# Patient Record
Sex: MALE | Race: WHITE
[De-identification: names, ages, dates, MRNs, and addresses within clinical notes are randomized per-mention and may not be internally consistent; named-entity substitution may affect disease eponyms.]

---

## 2018-10-11 ENCOUNTER — HOSPITAL ENCOUNTER (EMERGENCY)
Dept: HOSPITAL 97 - ER | Age: 38
Discharge: HOME | End: 2018-10-11
Payer: COMMERCIAL

## 2018-10-11 DIAGNOSIS — Z88.5: ICD-10-CM

## 2018-10-11 DIAGNOSIS — I10: Primary | ICD-10-CM

## 2018-10-11 DIAGNOSIS — F41.9: ICD-10-CM

## 2018-10-11 LAB
ALBUMIN SERPL BCP-MCNC: 4.1 G/DL (ref 3.4–5)
ALP SERPL-CCNC: 67 U/L (ref 45–117)
ALT SERPL W P-5'-P-CCNC: 83 U/L (ref 12–78)
AST SERPL W P-5'-P-CCNC: 28 U/L (ref 15–37)
BUN BLD-MCNC: 12 MG/DL (ref 7–18)
GLUCOSE SERPLBLD-MCNC: 182 MG/DL (ref 74–106)
HCT VFR BLD CALC: 44.6 % (ref 39.6–49)
INR BLD: 1.13
LYMPHOCYTES # SPEC AUTO: 1.6 K/UL (ref 0.7–4.9)
MAGNESIUM SERPL-MCNC: 2.1 MG/DL (ref 1.8–2.4)
MCH RBC QN AUTO: 28.4 PG (ref 27–35)
MCV RBC: 80 FL (ref 80–100)
NT-PROBNP SERPL-MCNC: 34 PG/ML (ref ?–125)
PMV BLD: 9.9 FL (ref 7.6–11.3)
POTASSIUM SERPL-SCNC: 3.5 MMOL/L (ref 3.5–5.1)
RBC # BLD: 5.58 M/UL (ref 4.33–5.43)
TROPONIN (EMERG DEPT USE ONLY): < 0.02 NG/ML (ref 0–0.04)

## 2018-10-11 PROCEDURE — 36415 COLL VENOUS BLD VENIPUNCTURE: CPT

## 2018-10-11 PROCEDURE — 81003 URINALYSIS AUTO W/O SCOPE: CPT

## 2018-10-11 PROCEDURE — 83880 ASSAY OF NATRIURETIC PEPTIDE: CPT

## 2018-10-11 PROCEDURE — 80076 HEPATIC FUNCTION PANEL: CPT

## 2018-10-11 PROCEDURE — 85025 COMPLETE CBC W/AUTO DIFF WBC: CPT

## 2018-10-11 PROCEDURE — 80048 BASIC METABOLIC PNL TOTAL CA: CPT

## 2018-10-11 PROCEDURE — 84484 ASSAY OF TROPONIN QUANT: CPT

## 2018-10-11 PROCEDURE — 71045 X-RAY EXAM CHEST 1 VIEW: CPT

## 2018-10-11 PROCEDURE — 93005 ELECTROCARDIOGRAM TRACING: CPT

## 2018-10-11 PROCEDURE — 99284 EMERGENCY DEPT VISIT MOD MDM: CPT

## 2018-10-11 PROCEDURE — 83735 ASSAY OF MAGNESIUM: CPT

## 2018-10-11 PROCEDURE — 85610 PROTHROMBIN TIME: CPT

## 2018-10-11 NOTE — ER
Nurse's Notes                                                                                     

 [facility]                                                                

Name: Barrington Edward                                                                            

Age: 38 yrs                                                                                       

Sex: Male                                                                                         

: 1980                                                                                   

MRN: [id number]                                                                                   

Arrival Date: 10/11/2018                                                                          

Time: 14:24                                                                                       

Account#: [id number]                                                                            

Bed 14                                                                                            

Private MD:                                                                                       

Diagnosis: Palpitations;Anxiety disorder, unspecified;Essential (primary) hypertension            

                                                                                                  

Presentation:                                                                                     

10/11                                                                                             

14:24 Presenting complaint: EMS states: He was at work when he started to have palpitations   aj1 

      and feel anxious. When they checked his blood pressure it was 220/120. Patient reports      

      that he has had high blood pressure before but he lost weight and was able to stop          

      taking medication for it. Patient reports that his blood pressure has never been that       

      high before. He was given NTG x2 doses sublingual en route. Patient reports headache        

      and chest pressure at this time. Denies SOB, dizziness. Transition of care: patient was     

      not received from another setting of care. Onset of symptoms was 2018. Risk     

      Assessment: Do you want to hurt yourself or someone else? Patient reports no desire to      

      harm self or others. Initial Sepsis Screen: Does the patient meet any 2 criteria? No.       

      Patient's initial sepsis screen is negative. Does the patient have a suspected source       

      of infection? No. Patient's initial sepsis screen is negative. Care prior to arrival:       

      None.                                                                                       

14:24 Method Of Arrival: EMS: [facility]                                                         aj 

14:24 Acuity: MARIZA 3                                                                           aj1 

                                                                                                  

Triage Assessment:                                                                                

14:28 General: Appears in no apparent distress. uncomfortable, Behavior is cooperative,       aj1 

      anxious. Pain: Complains of pain in mid-sternal area Pain currently is 2 out of 10 on a     

      pain scale.                                                                                 

                                                                                                  

Historical:                                                                                       

- Allergies:                                                                                      

14:28 hydrocodone;                                                                            aj1 

- Home Meds:                                                                                      

14:28 None [Active];                                                                          aj1 

- PMHx:                                                                                           

14:28 Hypertension;                                                                           aj1 

- PSHx:                                                                                           

14:28 None;                                                                                   aj1 

                                                                                                  

- Immunization history:: Flu vaccine is not up to date.                                           

- Social history:: Smoking status: Patient/guardian denies using tobacco.                         

- Ebola Screening: : Patient denies travel to an Ebola-affected area in the 21 days               

  before illness onset.                                                                           

                                                                                                  

                                                                                                  

Screenin:29 Abuse screen: Denies threats or abuse. Denies injuries from another. Nutritional        aj1 

      screening: No deficits noted. Tuberculosis screening: No symptoms or risk factors           

      identified.                                                                                 

19:00 Fall Risk None identified.                                                              jb4 

                                                                                                  

Assessment:                                                                                       

14:29 General: Appears in no apparent distress. uncomfortable, Behavior is cooperative,       aj1 

      anxious. Pain: Complains of pain in mid-sternal area Pain does not radiate. Pain            

      currently is 2 out of 10 on a pain scale. Quality of pain is described as pressure.         

      Neuro: Level of Consciousness is awake, alert, obeys commands, Speech is normal, Facial     

      symmetry appears normal. Cardiovascular: Reports chest pain, palpitations, Denies           

      lightheadedness, vomiting, Heart tones S1 S2 present Patient's skin is warm and dry.        

      Rhythm is sinus tachycardia Chest pain is described as mild, quality is pressure, is        

      located in substernal area. Respiratory: Airway is patent Respiratory effort is even,       

      unlabored, Respiratory pattern is regular, symmetrical, Breath sounds are clear             

      bilaterally. Denies shortness of breath. GI: No signs and/or symptoms were reported         

      involving the gastrointestinal system. : No signs and/or symptoms were reported           

      regarding the genitourinary system. EENT: No signs and/or symptoms were reported            

      regarding the EENT system. Derm: No signs and/or symptoms reported regarding the            

      dermatologic system. Skin is pink, warm \T\ dry. normal. Musculoskeletal: No signs and/or   

      symptoms reported regarding the musculoskeletal system. Circulation, motion, and            

      sensation intact.                                                                           

15:14 Reassessment: Patient appears in no apparent distress at this time. No changes from     aj1 

      previously documented assessment. Patient and/or family updated on plan of care and         

      expected duration. Pain level reassessed. Patient is alert, oriented x 3, equal             

      unlabored respirations, skin warm/dry/pink.                                                 

16:15 Reassessment: Patient appears in no apparent distress at this time. No changes from     aj1 

      previously documented assessment. Patient and/or family updated on plan of care and         

      expected duration. Pain level reassessed. Patient is alert, oriented x 3, equal             

      unlabored respirations, skin warm/dry/pink.                                                 

17:01 Reassessment: Went to medicate patient with Ativan, patient states that his car is at   [facility] 

      the plant and when he leaves here he has to drive to [place] to get home. Patient was       

      instructed that he would not be able to drive if Ativan was administered. Offered to        

      obtain order for non-narcotic anxiety medication. Patient declines at this time, states     

      he will try to find someone local to stay with or a hotel to stay at tonight and his        

       will drive him to where he is staying. Will check back with patient to       

      see if he has a ride to where he will be staying.                                           

18:13 Reassessment: Patient appears in no apparent distress at this time. No changes from     aj1 

      previously documented assessment. Patient and/or family updated on plan of care and         

      expected duration. Pain level reassessed. Patient is alert, oriented x 3, equal             

      unlabored respirations, skin warm/dry/pink.                                                 

19:00 Reassessment: Patient appears in no apparent distress at this time. No changes from     jb4 

      previously documented assessment. Patient and/or family updated on plan of care and         

      expected duration. Pain level reassessed.                                                   

20:00 Reassessment: Patient appears in no apparent distress at this time. Patient and/or      jb4 

      family updated on plan of care and expected duration. Pain level reassessed. Patient is     

      alert, oriented x 3, equal unlabored respirations, skin warm/dry/pink. Discussed D/c,       

      F/u with pt, denies questions or concerns.                                                  

                                                                                                  

Vital Signs:                                                                                      

14:28  / 123; Pulse 110; Resp 23; Temp 98.4; Pulse Ox 98% on R/A; Weight 99.79 kg (R);  aj1 

      Height 5 ft. 6 in. (167.64 cm) (R); Pain 2/10;                                              

15:13  / 108; Pulse 104; Resp 18; Pulse Ox 98% on R/A;                                  aj1 

16:15  / 117; Pulse 97; Resp 22; Pulse Ox 100% on R/A;                                  aj1 

17:00  / 116; Pulse 96; Resp 20; Pulse Ox 97% on R/A;                                   aj1 

18:13  / 107; Pulse 91; Resp 15; Pulse Ox 97% on R/A;                                   aj1 

19:45  / 97; Pulse 103; Resp 18; Pulse Ox 97% on R/A;                                   jb4 

14:28 Body Mass Index 35.51 (99.79 kg, 167.64 cm)                                             aj1 

                                                                                                  

ED Course:                                                                                        

14:24 Patient arrived in ED.                                                                  aj1 

14:27 Zhang Guerra NP is PHCP.                                                           pm1 

14:27 Braden Sanchez MD is Attending Physician.                                              pm1 

14:27 Triage completed.                                                                       aj1 

14:28 Arm band placed on.                                                                     aj1 

14:29 Patient has correct armband on for positive identification. Cardiac monitor on. Pulse   aj1 

      ox on. NIBP on.                                                                             

14:29 No provider procedures requiring assistance completed.                                  aj1 

14:49 EKG done, by EKG tech. reviewed by Zhang Guerra NP.                                  sm3 

14:50 Harmony Casillas, RN is Primary Nurse.                                                   aj1 

14:56 XRAY Chest (1 view) In Process Unspecified.                                             EDMS

20:00 IV discontinued, intact, bleeding controlled.                                           jb4 

                                                                                                  

Administered Medications:                                                                         

17:35 Drug: Ativan 2 mg Route: PO;                                                            aj1 

                                                                                                  

                                                                                                  

Outcome:                                                                                          

18:38 Discharge ordered by MD.                                                                pm1 

20:00 Discharged to home ambulatory.                                                          jb4 

20:00 Condition: stable                                                                           

20:00 Discharge instructions given to patient, Instructed on discharge instructions, follow       

      up and referral plans. medication usage, Demonstrated understanding of instructions,        

      follow-up care, medications, Prescriptions given X 1.                                       

20:01 Patient left the ED.                                                                    jb4 

                                                                                                  

Signatures:                                                                                       

Dispatcher [employer]                           EDMS                                                 

Harmony Casillas, RN                     RN   aj1                                                  

Zhang Guerra, JONATHAN                    NP   pm1                                                  

Amadou Bassett RN                       RN   jb4                                                  

Alanna Coon                              sm3                                                  

                                                                                                  

**************************************************************************************************

## 2018-10-11 NOTE — EDPHYS
Physician Documentation                                                                           

 [facility]                                                                

Name: Barrington Edward                                                                            

Age: 38 yrs                                                                                       

Sex: Male                                                                                         

: 1980                                                                                   

MRN: [id number]                                                                                   

Arrival Date: 10/11/2018                                                                          

Time: 14:24                                                                                       

Account#: [id number]                                                                            

Bed 14                                                                                            

Private MD:                                                                                       

ED Physician Braden Sanchez                                                                       

HPI:                                                                                              

10/11                                                                                             

18:45 This 38 yrs old  Male presents to ER via EMS with complaints of High Blood     pm1 

      Pressure, Anxiety.                                                                          

18:45 The patient has elevated blood pressure and discovered this work. Onset: The            pm1 

      symptoms/episode began/occurred at 13:30. Modifying factors: The symptoms are               

      aggravated by anxiety, The symptoms are alleviated by nothing. Associated signs and         

      symptoms: Pertinent positives: chest pain, Pertinent negatives: dizziness, dyspnea,         

      headache, nausea, vomiting. Severity of symptoms: in the emergency department the blood     

      pressure is improved. The patient has experienced similar episodes in the past,             

      multiple times. The patient has not recently seen a physician. Patient with sensation       

      of onset of anxiety with palpitations and chest pain at 1330. No SOB, nausea, vomiting,     

      diaphoresis.                                                                                

                                                                                                  

Historical:                                                                                       

- Allergies:                                                                                      

14:28 hydrocodone;                                                                            aj1 

- Home Meds:                                                                                      

14:28 None [Active];                                                                          aj1 

- PMHx:                                                                                           

14:28 Hypertension;                                                                           aj1 

- PSHx:                                                                                           

14:28 None;                                                                                   aj1 

                                                                                                  

- Immunization history:: Flu vaccine is not up to date.                                           

- Social history:: Smoking status: Patient/guardian denies using tobacco.                         

- Ebola Screening: : Patient denies travel to an Ebola-affected area in the 21 days               

  before illness onset.                                                                           

                                                                                                  

                                                                                                  

ROS:                                                                                              

18:45 Constitutional: Negative for fever, chills, and weight loss, Eyes: Negative for injury, pm1 

      pain, redness, and discharge, ENT: Negative for injury, pain, and discharge, Neck:          

      Negative for injury, pain, and swelling.                                                    

18:45 Respiratory: Negative for shortness of breath, cough, wheezing, and pleuritic chest         

      pain, Abdomen/GI: Negative for abdominal pain, nausea, vomiting, diarrhea, and              

      constipation, Back: Negative for injury and pain, : Negative for injury, bleeding,        

      discharge, and swelling, MS/Extremity: Negative for injury and deformity, Skin:             

      Negative for injury, rash, and discoloration, Neuro: Negative for headache, weakness,       

      numbness, tingling, and seizure.                                                            

18:45 Cardiovascular: Positive for chest pain, palpitations, Negative for edema, orthopnea.       

18:45 Psych: Positive for anxiety, Negative for depression.                                       

                                                                                                  

Exam:                                                                                             

18:45 Constitutional:  This is a well developed, well nourished patient who is awake, alert,  pm1 

      and in no acute distress. Head/Face:  Normocephalic, atraumatic. Eyes:  Pupils equal        

      round and reactive to light, extra-ocular motions intact.  Lids and lashes normal.          

      Conjunctiva and sclera are non-icteric and not injected.  Cornea within normal limits.      

      Periorbital areas with no swelling, redness, or edema. ENT:  Nares patent. No nasal         

      discharge, no septal abnormalities noted.  Tympanic membranes are normal and external       

      auditory canals are clear.  Oropharynx with no redness, swelling, or masses, exudates,      

      or evidence of obstruction, uvula midline.  Mucous membranes moist. Neck:  Trachea          

      midline, no thyromegaly or masses palpated, and no cervical lymphadenopathy.  Supple,       

      full range of motion without nuchal rigidity, or vertebral point tenderness.  No            

      Meningismus. Chest/axilla:  Normal chest wall appearance and motion.  Nontender with no     

      deformity.  No lesions are appreciated. Cardiovascular:  Regular rate and rhythm with a     

      normal S1 and S2.  No gallops, murmurs, or rubs.  Normal PMI, no JVD.  No pulse             

      deficits. Respiratory:  Lungs have equal breath sounds bilaterally, clear to                

      auscultation and percussion.  No rales, rhonchi or wheezes noted.  No increased work of     

      breathing, no retractions or nasal flaring. Abdomen/GI:  Soft, non-tender, with normal      

      bowel sounds.  No distension or tympany.  No guarding or rebound.  No evidence of           

      tenderness throughout. Back:  No spinal tenderness.  No costovertebral tenderness.          

      Full range of motion. Skin:  Warm, dry with normal turgor.  Normal color with no            

      rashes, no lesions, and no evidence of cellulitis. MS/ Extremity:  Pulses equal, no         

      cyanosis.  Neurovascular intact.  Full, normal range of motion.                             

18:45 Neuro: Orientation: is normal, Motor: is normal, moves all fours, Sensation: is normal,     

      no obvious gross deficits, Gait: is steady, at a normal pace, without difficulty.           

                                                                                                  

Vital Signs:                                                                                      

14:28  / 123; Pulse 110; Resp 23; Temp 98.4; Pulse Ox 98% on R/A; Weight 99.79 kg (R);  aj1 

      Height 5 ft. 6 in. (167.64 cm) (R); Pain 2/10;                                              

15:13  / 108; Pulse 104; Resp 18; Pulse Ox 98% on R/A;                                  aj1 

16:15  / 117; Pulse 97; Resp 22; Pulse Ox 100% on R/A;                                  aj1 

17:00  / 116; Pulse 96; Resp 20; Pulse Ox 97% on R/A;                                   aj1 

18:13  / 107; Pulse 91; Resp 15; Pulse Ox 97% on R/A;                                   aj1 

19:45  / 97; Pulse 103; Resp 18; Pulse Ox 97% on R/A;                                   jb4 

14:28 Body Mass Index 35.51 (99.79 kg, 167.64 cm)                                             aj1 

                                                                                                  

MDM:                                                                                              

14:32 Patient medically screened.                                                             pm1 

18:37 Data reviewed: vital signs. Data interpreted: Pulse oximetry: on room air is 97 %.      pm1 

      Interpretation: normal. Counseling: I had a detailed discussion with the patient and/or     

      guardian regarding: the historical points, exam findings, and any diagnostic results        

      supporting the discharge/admit diagnosis, lab results, radiology results, the need for      

      outpatient follow up, to return to the emergency department if symptoms worsen or           

      persist or if there are any questions or concerns that arise at home.                       

                                                                                                  

10/11                                                                                             

14:33 Order name: Basic Metabolic Panel; Complete Time: 15:57                                 pm1 

10/11                                                                                             

14:33 Order name: CBC with Diff; Complete Time: 15:57                                         pm1 

10/11                                                                                             

14:33 Order name: LFT's; Complete Time: 15:57                                                 pm1 

10/11                                                                                             

14:33 Order name: Magnesium; Complete Time: 15:58                                             pm1 

10/11                                                                                             

14:33 Order name: NT PRO-BNP; Complete Time: 15:58                                            pm1 

10/11                                                                                             

14:33 Order name: PT-INR; Complete Time: 15:57                                                pm1 

10/11                                                                                             

14:33 Order name: Troponin (emerg Dept Use Only); Complete Time: 15:58                        pm1 

10/11                                                                                             

14:33 Order name: XRAY Chest (1 view); Complete Time: 16:53                                   pm1 

10/11                                                                                             

14:33 Order name: EKG; Complete Time: 14:34                                                   pm1 

10/11                                                                                             

14:33 Order name: Cardiac monitoring; Complete Time: 14:50                                    pm1 

10/11                                                                                             

14:33 Order name: EKG - Nurse/Tech; Complete Time: 14:50                                      pm1 

10/11                                                                                             

17:00 Order name: Urine Dipstick--Ancillary (enter results); Complete Time: 18:37             eb  

10/11                                                                                             

17:30 Order name: Troponin (emerg Dept Use Only); Complete Time: 18:37                        pm1 

10/11                                                                                             

14:33 Order name: IV Saline Lock; Complete Time: 14:51                                        pm1 

10/11                                                                                             

14:33 Order name: Labs collected and sent; Complete Time: 15:13                               pm1 

10/11                                                                                             

14:33 Order name: O2 Per Protocol; Complete Time: 14:51                                       pm1 

10/11                                                                                             

14:33 Order name: O2 Sat Monitoring; Complete Time: 14:51                                     pm1 

                                                                                                  

Administered Medications:                                                                         

17:35 Drug: Ativan 2 mg Route: PO;                                                            aj1 

                                                                                                  

                                                                                                  

Disposition:                                                                                      

10/11/18 18:38 Discharged to Home. Impression: Palpitations, Anxiety disorder, unspecified,       

  Essential (primary) hypertension.                                                               

- Condition is Stable.                                                                            

- Discharge Instructions: Panic Attacks, Holter Monitoring, Hypertension, Palpitations,           

  How to Take Your Blood Pressure, Easy-to-Read, Generalized Anxiety Disorder, DASH               

  Eating Plan, Managing Your Hypertension.                                                        

- Prescriptions for Hydroxyzine HCl 50 mg Oral Tablet - take 1 tablet by ORAL route               

  every 8 hours As needed; 20 tablet.                                                             

- Medication Reconciliation Form, Thank You Letter form.                                          

- Follow up: Emergency Department; When: As needed; Reason: Worsening of condition.               

  Follow up: Private Physician; When: 2 - 3 days; Reason: Recheck today's complaints,             

  Continuance of care, Re-evaluation by your physician.                                           

- Problem is new.                                                                                 

- Symptoms have improved.                                                                         

                                                                                                  

                                                                                                  

                                                                                                  

Addendum:                                                                                         

10/13/2018                                                                                        

     13:33 Co-signature as Attending Physician, Braden Sanchez MD I agree with the assessment and   k
dr

           plan of care.                                                                          

                                                                                                  

Signatures:                                                                                       

Dispatcher [employer]                           EDHarmony Mccain RN                     RN   aj1                                                  

Braden Sanchez MD MD   [facility]                                                  

Zhang Guerra, JONATHAN                    NP   pm1                                                  

Amadou Bassett, JODI                       RN   jb4                                                  

                                                                                                  

Corrections: (The following items were deleted from the chart)                                    

10/11                                                                                             

20:01 18:38 10/11/2018 18:38 Discharged to Home. Impression: Palpitations; Anxiety disorder,  jb4 

      unspecified; Essential (primary) hypertension. Condition is Stable. Forms are               

      Medication Reconciliation Form, Thank You Letter, Antibiotic Education, Prescription        

      Opioid Use. Follow up: Emergency Department; When: As needed; Reason: Worsening of          

      condition. Follow up: Private Physician; When: 2 - 3 days; Reason: Recheck today's          

      complaints, Continuance of care, Re-evaluation by your physician. Problem is new.           

      Symptoms have improved. pm1                                                                 

                                                                                                  

**************************************************************************************************

## 2018-10-11 NOTE — EKG
Test Date:    2018-10-11               Test Time:    14:23:24

Technician:   AMILCAR                                     

                                                     

MEASUREMENT RESULTS:                                       

Intervals:                                           

Rate:         106                                    

WV:           156                                    

QRSD:         86                                     

QT:           336                                    

QTc:          446                                    

Axis:                                                

P:            47                                     

WV:           156                                    

QRS:          3                                      

T:            32                                     

                                                     

INTERPRETIVE STATEMENTS:                                       

                                                     

Sinus tachycardia

Minimal voltage criteria for LVH, may be normal variant

Borderline ECG

No previous ECG available for comparison



Electronically Signed On 10-11-18 16:51:06 CDT by Domingo Peters

## 2018-10-11 NOTE — RAD REPORT
EXAM DESCRIPTION:  Tere Single View10/11/2018 2:56 pm

 

CLINICAL HISTORY:  Hypertension

 

COMPARISON:  none

 

FINDINGS:   The lungs appear clear of acute infiltrate. The heart is normal size

 

IMPRESSION:   No acute abnormalities displayed